# Patient Record
Sex: MALE | Race: WHITE | Employment: OTHER | URBAN - METROPOLITAN AREA
[De-identification: names, ages, dates, MRNs, and addresses within clinical notes are randomized per-mention and may not be internally consistent; named-entity substitution may affect disease eponyms.]

---

## 2020-06-08 ENCOUNTER — APPOINTMENT (OUTPATIENT)
Dept: GENERAL RADIOLOGY | Age: 25
End: 2020-06-08
Payer: COMMERCIAL

## 2020-06-08 ENCOUNTER — HOSPITAL ENCOUNTER (EMERGENCY)
Age: 25
Discharge: HOME | End: 2020-06-08
Payer: COMMERCIAL

## 2020-06-08 VITALS
HEART RATE: 79 BPM | WEIGHT: 160 LBS | DIASTOLIC BLOOD PRESSURE: 79 MMHG | RESPIRATION RATE: 16 BRPM | HEIGHT: 73 IN | OXYGEN SATURATION: 97 % | TEMPERATURE: 97.4 F | BODY MASS INDEX: 21.2 KG/M2 | SYSTOLIC BLOOD PRESSURE: 118 MMHG

## 2020-06-08 PROCEDURE — 29125 APPL SHORT ARM SPLINT STATIC: CPT

## 2020-06-08 PROCEDURE — 99213 OFFICE O/P EST LOW 20 MIN: CPT

## 2020-06-08 PROCEDURE — 73130 X-RAY EXAM OF HAND: CPT

## 2020-06-08 RX ORDER — IBUPROFEN 200 MG
800 TABLET ORAL EVERY 6 HOURS PRN
COMMUNITY

## 2020-06-08 ASSESSMENT — PAIN DESCRIPTION - PROGRESSION: CLINICAL_PROGRESSION: GRADUALLY IMPROVING

## 2020-06-08 ASSESSMENT — PAIN DESCRIPTION - ONSET: ONSET: SUDDEN

## 2020-06-08 ASSESSMENT — PAIN DESCRIPTION - DESCRIPTORS: DESCRIPTORS: THROBBING;SHOOTING

## 2020-06-08 ASSESSMENT — PAIN DESCRIPTION - FREQUENCY: FREQUENCY: INTERMITTENT

## 2020-06-08 ASSESSMENT — PAIN DESCRIPTION - LOCATION: LOCATION: HAND

## 2020-06-08 ASSESSMENT — PAIN DESCRIPTION - PAIN TYPE: TYPE: ACUTE PAIN

## 2020-06-08 ASSESSMENT — PAIN SCALES - GENERAL: PAINLEVEL_OUTOF10: 8

## 2020-06-08 ASSESSMENT — PAIN DESCRIPTION - ORIENTATION: ORIENTATION: LEFT

## 2020-06-08 NOTE — ED PROVIDER NOTES
This is a 22year old male that presents to urgent care with a complaint of left hand pain and swelling for the past 3 days. States he injured his left hand when he punched a truck window. No numbness or tingling. No other injury. Denies hand injury in the past. Is left handed. Review of Systems   Constitutional:        Pertinent positives and negatives are stated within HPI, all other systems reviewed and are negative. Physical Exam  Vitals signs and nursing note reviewed. Constitutional:       Appearance: He is well-developed. HENT:      Head: Normocephalic and atraumatic. Jaw: No trismus. Right Ear: Hearing, tympanic membrane, ear canal and external ear normal.      Left Ear: Hearing, tympanic membrane, ear canal and external ear normal.      Nose: Nose normal.      Right Sinus: No maxillary sinus tenderness or frontal sinus tenderness. Left Sinus: No maxillary sinus tenderness or frontal sinus tenderness. Mouth/Throat:      Pharynx: Uvula midline. No uvula swelling. Eyes:      General: Lids are normal.      Conjunctiva/sclera: Conjunctivae normal.      Pupils: Pupils are equal, round, and reactive to light. Neck:      Musculoskeletal: Normal range of motion and neck supple. Cardiovascular:      Rate and Rhythm: Normal rate and regular rhythm. Heart sounds: Normal heart sounds. No murmur. Pulmonary:      Effort: Pulmonary effort is normal.      Breath sounds: Normal breath sounds. Abdominal:      General: Bowel sounds are normal.      Palpations: Abdomen is soft. Abdomen is not rigid. Tenderness: There is no abdominal tenderness. There is no guarding or rebound. Musculoskeletal:      Left wrist: Normal.      Comments: Left hand pain (fourth and fifth metacarpals mostly) there is swelling to the lateral hand. No open area/redness. No cyanosis. ROM full but painful. No wrist pain. Has palpable radial pulse. Brisk cap refill.     Skin:     General: Skin

## 2020-06-09 ENCOUNTER — OFFICE VISIT (OUTPATIENT)
Dept: ORTHOPEDIC SURGERY | Age: 25
End: 2020-06-09
Payer: COMMERCIAL

## 2020-06-09 VITALS — WEIGHT: 160 LBS | HEIGHT: 73 IN | BODY MASS INDEX: 21.2 KG/M2

## 2020-06-09 PROCEDURE — 99203 OFFICE O/P NEW LOW 30 MIN: CPT | Performed by: ORTHOPAEDIC SURGERY

## 2020-06-09 PROCEDURE — 26600 TREAT METACARPAL FRACTURE: CPT | Performed by: ORTHOPAEDIC SURGERY

## 2020-06-09 PROCEDURE — G8420 CALC BMI NORM PARAMETERS: HCPCS | Performed by: ORTHOPAEDIC SURGERY

## 2020-06-09 PROCEDURE — G8427 DOCREV CUR MEDS BY ELIG CLIN: HCPCS | Performed by: ORTHOPAEDIC SURGERY

## 2020-06-09 PROCEDURE — 4004F PT TOBACCO SCREEN RCVD TLK: CPT | Performed by: ORTHOPAEDIC SURGERY

## 2020-07-22 ENCOUNTER — OFFICE VISIT (OUTPATIENT)
Dept: ORTHOPEDIC SURGERY | Age: 25
End: 2020-07-22

## 2020-07-22 VITALS — BODY MASS INDEX: 21.2 KG/M2 | HEIGHT: 73 IN | WEIGHT: 160 LBS

## 2020-07-22 PROCEDURE — 99024 POSTOP FOLLOW-UP VISIT: CPT | Performed by: NURSE PRACTITIONER

## 2020-07-22 NOTE — PROGRESS NOTES
Josue Peralta is a 26y.o. year old male who presents today for evaluation of a left hand injury which occurred on 6/8/2020. He has been in exos with nwb, overall doing well  The patient's past medical history, medications, and review of systems was reviewed. On Physical Exam, Josue Peralta is well-developed, well-nourished, and oriented to person, place and time. his gait is intact. On evaluation of his left upper extremity, there is not obvious deformity. There is no swelling and is no  ecchymosis. he is tender to palpation over the 5th base metacarpal, and otherwise nontender over the remainder of the extremity. Range of motion full over the left hand. The skin overlying the left hand is intact without evidence of lesion, laceration or abrasion. Distal pulses are 2+ and symmetric bilaterally. Sensation is grossly intact to light touch and symmetric bilaterally. Xrays:    Near healed 5th metacarpal base fracture    Impression:     Diagnosis Orders   1.  Displaced fracture of base of fifth metacarpal bone, left hand, initial encounter for closed fracture           Plan:   Dc exos  Ease into lifting pushing pulling  Fu prn  hep

## 2024-04-26 DIAGNOSIS — M21.371 RIGHT FOOT DROP: Primary | ICD-10-CM

## 2024-04-29 ENCOUNTER — PROCEDURE VISIT (OUTPATIENT)
Dept: PHYSICAL MEDICINE AND REHAB | Age: 29
End: 2024-04-29
Payer: COMMERCIAL

## 2024-04-29 VITALS — BODY MASS INDEX: 20.54 KG/M2 | HEIGHT: 73 IN | WEIGHT: 155 LBS

## 2024-04-29 DIAGNOSIS — M21.371 RIGHT FOOT DROP: Primary | ICD-10-CM

## 2024-04-29 PROCEDURE — 4004F PT TOBACCO SCREEN RCVD TLK: CPT | Performed by: PHYSICAL MEDICINE & REHABILITATION

## 2024-04-29 PROCEDURE — G8428 CUR MEDS NOT DOCUMENT: HCPCS | Performed by: PHYSICAL MEDICINE & REHABILITATION

## 2024-04-29 PROCEDURE — 99203 OFFICE O/P NEW LOW 30 MIN: CPT | Performed by: PHYSICAL MEDICINE & REHABILITATION

## 2024-04-29 PROCEDURE — 95886 MUSC TEST DONE W/N TEST COMP: CPT | Performed by: PHYSICAL MEDICINE & REHABILITATION

## 2024-04-29 PROCEDURE — G8420 CALC BMI NORM PARAMETERS: HCPCS | Performed by: PHYSICAL MEDICINE & REHABILITATION

## 2024-04-29 PROCEDURE — 95911 NRV CNDJ TEST 9-10 STUDIES: CPT | Performed by: PHYSICAL MEDICINE & REHABILITATION

## 2024-04-29 NOTE — PROGRESS NOTES
[facility]  Electrodiagnostic Laboratory  *Accredited by the [facility] with exemplary status  [address] [place] [address]  Phone: [phone]  Fax: [phone]    Referring Provider: Betito Bauer DC  Primary Care Physician: No primary care provider on file.  Patient Name: Nacho Evans  Patient YOB: 1995  Gender: male  BMI: Body mass index is 20.45 kg/m².  Height 1.854 m (6' 1\"), weight 70.3 kg (155 lb).    5/1/2024    Reason for Referral: Foot drop     Description of clinical problem:   Chief Complaint   Patient presents with    Extremity Pain     With being on the feet for prolonged periods of time will cause pain/discomfort in the entire right leg stemming from the lower back. 5+ years of symp.     Numbness     Numbness/tingling of the lateral part of the right leg from the knee down.     Extremity Weakness     Decrease strength in the leg and foot.      Sensory NCS      Nerve / Sites Rec. Site Peak Lat PP Amp Segments Distance Velocity Temp.     ms µV  cm m/s °C   R Sural - Ankle (Calf)      Calf Ankle 3.65 9.6 Calf - Ankle 14 51 31.8   R Superficial peroneal - Ankle      Lat leg Ankle NR* NR Lat leg - Ankle 10 NR 31.6   L Superficial peroneal - Ankle      Lat leg Ankle 3.02 17.7 Lat leg - Ankle 10 47 32       Motor NCS      Nerve / Sites Muscle Onset Amplitude Segments Distance Velocity Temp.     ms mV  cm m/s °C   R Peroneal - EDB      Ankle EDB 3.91 4.3 Ankle - EDB 8  31.6      Fib head EDB 11.15 4.1 Fib head - Ankle 33 46 31.6      Above Knee EDB 15.00 0.5* Above Knee - Fib head 10 26* 31.7   L Peroneal - EDB      Ankle EDB 4.27 4.8 Ankle - EDB 8  32      Fib head EDB 11.09 4.8 Fib head - Ankle 33 48 32      Above Knee EDB 13.02 4.9 Above Knee - Fib head 10 52 32   R Peroneal - Tib Ant      Fib Head Tib Ant 2.60 4.2 Fib Head - Tib Ant 8  31.8      Above Knee Tib Ant 6.09 1.2* Above Knee - Fib Head 10 29* 31.8   R Tibial - AH      Ankle AH 5.31

## 2024-04-29 NOTE — PATIENT INSTRUCTIONS
Electrodiagnotic Laboratory  Accredited by the [facility] with Exemplary status  ALEKS Perea D.O.   [facility]  [address] [place] [address]  Phone: [phone]  Fax: [phone]        Today you had an electrodiagnostic exam which included nerve conduction studies (NCS) and electromyography (EMG). This test evaluated the electrical activity of your nerves and muscles to help determine if you have a nerve or muscle disease.  This test can help determine the location and type of a nerve or muscle problem. This will help your referring doctor diagnose your condition and determine the appropriate next step in your treatment plan.     After your test:    1. There are no long lasting side effects of the test.     2. You may resume your normal activities without restrictions.     3.  Resume any medications that were stopped for the test.     4  If you have sore areas or bruising in your muscles where the needle was placed, apply a cold pack to the sore area for 15-20 minutes three to four times a day as needed for pain.  The soreness should go away in about 1-2 days.     5. Your results were provided  Briefly at the end of your test and the final detailed report will be provided to your referring physician, and/or primary care physician and any other parties you requested within 1-2 days of the examination. You may wish to contact your referring provider after a few days to determine what they would like you to do next.     6.  Please call [phone] with any questions or concerns and if you develop increased body temperature/fever, swelling, tenderness, increased pain and/or drainage from the sites where the needle was placed.     Thank you for choosing us for your health care needs.

## 2024-04-29 NOTE — PROGRESS NOTES
Electrodiagnostic Laboratory  *Accredited by the [facility] with exemplary status  [address] [place] [address]  Phone: [phone]  Fax: [phone]      Date of Examination: 05/01/24    Patient Name: Nacho Evans    An independent historian was not needed.     Nacho Evans  is a 28 y.o. year old male who was seen today regarding   Chief Complaint   Patient presents with    Extremity Pain     With being on the feet for prolonged periods of time will cause pain/discomfort in the entire right leg stemming from the lower back. 5+ years of symp.     Numbness     Numbness/tingling of the lateral part of the right leg from the knee down.     Extremity Weakness     Decrease strength in the leg and foot.    The symptoms are constant.       I have reviewed the referring provider's office note.    There is not a family history of neuromuscular conditions.     Physical Exam: General: The patient is in no apparent distress.  MSK: There is no joint effusion, deformity, instability, swelling, erythema or warmth.  AROM is full in the spine and extremities. SLR is negative Neurologic: 0/5 right great toe extension, 2/5 right ankle dorsiflexion, decreased light touch right lateral leg and dorsal foot, otherwise, no focal sensorimotor deficit.  Reflexes 2+ and symmetric. Gait is normal.    Impression:     1. Right foot drop [M21.371]        Plan:   EMG is indicated to evaluate the above diagnosis.    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  .   AFO  PT  Consider surgical consultation.   The patient was educated about the diagnosis and the prognosis.   Advised patient to follow up with referring provider.       Thank you for allowing me to participate in the care of your patient.      Sincerely,       More Robins D.O., P.T.  Board Certified Physical Medicine and Rehabilitation  Board Certified Electrodiagnostic Medicine

## 2024-05-02 DIAGNOSIS — M21.371 RIGHT FOOT DROP: ICD-10-CM

## 2024-08-01 ENCOUNTER — HOSPITAL ENCOUNTER (EMERGENCY)
Age: 29
Discharge: HOME | End: 2024-08-01
Payer: COMMERCIAL

## 2024-08-01 VITALS
HEART RATE: 98 BPM | SYSTOLIC BLOOD PRESSURE: 112 MMHG | RESPIRATION RATE: 18 BRPM | BODY MASS INDEX: 19.79 KG/M2 | OXYGEN SATURATION: 100 % | DIASTOLIC BLOOD PRESSURE: 60 MMHG | TEMPERATURE: 98 F | WEIGHT: 150 LBS

## 2024-08-01 DIAGNOSIS — L25.5 RHUS DERMATITIS: Primary | ICD-10-CM

## 2024-08-01 PROCEDURE — 99211 OFF/OP EST MAY X REQ PHY/QHP: CPT

## 2024-08-01 RX ORDER — SAL ACID/UREA/PETROLATUM,WHITE 5 %-10 %
1 OINTMENT (GRAM) TOPICAL 2 TIMES DAILY
Qty: 12 EACH | Refills: 0 | Status: SHIPPED | OUTPATIENT
Start: 2024-08-01

## 2024-08-01 ASSESSMENT — PAIN - FUNCTIONAL ASSESSMENT: PAIN_FUNCTIONAL_ASSESSMENT: 0-10

## 2024-08-01 NOTE — DISCHARGE INSTRUCTIONS
Dissolve the powdered packet of the Domeboro solution in a small dish of water, soak a washcloth or paper towel in the solution and apply directly to your rash.  Leave on for a few minutes and then remove letting the solution dry onto your skin.  This will help dry up the rash.  Do this twice daily until the rash clears.

## 2024-08-01 NOTE — ED PROVIDER NOTES
[facility]  Department of Emergency Medicine     Encounter Note  Admit Date/RoomTime: 2024  6:15 PM   Room:   NAME: Nacho Evans  : 1995  MRN: [id number]     Chief Complaint:  Rash (Poison ivy   and poison oak)    History of Present Illness       Nacho Evans is a 29 y.o. old male who presents to the urgent care with complaints of a 2-day history of a rash to his bilateral forearms.  Tried some calamine lotion on it, did not help.  States the rash is pruritic.  Believes he has poison ivy.  No fevers.  No sore throat.    ROS   Pertinent positives and negatives are stated within HPI, all other systems reviewed and are negative.    Past Medical History:  has a past medical history of Asthma, Chronic back pain, Meningitis, and Shingles.    Surgical History:  has a past surgical history that includes Tonsillectomy.    Social History:  reports that he has been smoking cigarettes. He has never used smokeless tobacco. He reports current alcohol use. He reports current drug use. Drug: Marijuana (Weed).    Family History: family history includes Depression in his mother; High Blood Pressure in his father; High Cholesterol in his father.     Allergies: Patient has no known allergies.    Physical Exam   Oxygen Saturation Interpretation: Normal.        ED Triage Vitals   BP Temp Temp src Pulse Resp SpO2 Height Weight   -- -- -- -- -- -- -- --         Constitutional:  Alert, development consistent with age.  HEENT:  NC/NT.  Airway patent.  Eyes:  PERRL, EOMI, no discharge.   Ears:  TMs without perforation, injection, or bulging.  External canals clear without exudate.  Mouth:  Mucous membranes moist without lesions, tongue and gums normal.  Throat:  Pharynx without injection, exudate, or tonsillar hypertrophy.  Airway patient.  Neck:  Supple.  No lymphadenopathy.  Respiratory:  Clear to auscultation and breath sounds equal.  CV:  Regular rate and rhythm.  GI:  Abdomen Soft,

## 2025-01-29 ENCOUNTER — HOSPITAL ENCOUNTER (EMERGENCY)
Age: 30
Discharge: HOME | End: 2025-01-29
Payer: COMMERCIAL

## 2025-01-29 VITALS
SYSTOLIC BLOOD PRESSURE: 116 MMHG | DIASTOLIC BLOOD PRESSURE: 76 MMHG | RESPIRATION RATE: 16 BRPM | WEIGHT: 145 LBS | BODY MASS INDEX: 19.13 KG/M2 | HEART RATE: 79 BPM | OXYGEN SATURATION: 100 % | TEMPERATURE: 98.6 F

## 2025-01-29 DIAGNOSIS — J01.90 ACUTE SINUSITIS, RECURRENCE NOT SPECIFIED, UNSPECIFIED LOCATION: Primary | ICD-10-CM

## 2025-01-29 LAB
INFLUENZA A BY PCR: NOT DETECTED
INFLUENZA B BY PCR: NOT DETECTED

## 2025-01-29 PROCEDURE — 99211 OFF/OP EST MAY X REQ PHY/QHP: CPT

## 2025-01-29 PROCEDURE — 87502 INFLUENZA DNA AMP PROBE: CPT

## 2025-01-29 RX ORDER — FLUTICASONE PROPIONATE 50 MCG
SPRAY, SUSPENSION (ML) NASAL
Qty: 16 G | Refills: 0 | Status: SHIPPED | OUTPATIENT
Start: 2025-01-29

## 2025-01-29 RX ORDER — AMOXICILLIN 875 MG/1
875 TABLET, COATED ORAL 2 TIMES DAILY
Qty: 14 TABLET | Refills: 0 | Status: SHIPPED | OUTPATIENT
Start: 2025-01-29 | End: 2025-02-05

## 2025-01-29 ASSESSMENT — PAIN - FUNCTIONAL ASSESSMENT: PAIN_FUNCTIONAL_ASSESSMENT: NONE - DENIES PAIN

## 2025-01-29 NOTE — DISCHARGE INSTRUCTIONS
Dayquil  Tylenol 650 to 1000 mg every 8 hours as needed for pain/fever.  Ibuprofen 600 mg every 8 hours as needed for inflammation/pain/fever. Take with food and water.

## 2025-01-29 NOTE — ED PROVIDER NOTES
[facility] URGENT CARE  EMERGENCY DEPARTMENT ENCOUNTER        NAME: Nacho Evans  :  1995  MRN:  [id number]  Date of evaluation: 2025  Provider: Kevin Davidson PA-C  PCP: No primary care provider on file.  Note Started : 11:51 AM EST 25    Chief Complaint: Nasal Congestion (Head cold, sore throat, stuffy/runny nose, started Thursday night)      This is a 29-year-old male that presents to urgent care complaining of sinus and URI symptoms for the past 6 days.  There is been a cough as well.  Patient denies any abdominal pain nausea vomiting diarrhea urinary symptoms.  On first contact patient he appears to be in no acute distress.      Review of Systems  Pertinent positives and negatives are stated within HPI, all other systems reviewed and are negative.     Allergies: Patient has no known allergies.     --------------------------------------------- PAST HISTORY ---------------------------------------------  Past Medical History:  has a past medical history of Asthma, Chronic back pain, Meningitis, and Shingles.    Past Surgical History:  has a past surgical history that includes Tonsillectomy.    Social History:  reports that he has quit smoking. His smoking use included cigarettes. He has never used smokeless tobacco. He reports current alcohol use. He reports current drug use. Drug: Marijuana (Weed).    Family History: family history includes Depression in his mother; High Blood Pressure in his father; High Cholesterol in his father.     The patient’s home medications have been reviewed.    The nursing notes within the ED encounter have been reviewed.     ------------------------------------------------SCREENINGS----------------------------------------------                        CIWA Assessment  BP: 116/76  Pulse: 79           ---------------------------------------------PHYSICAL EXAM --------------------------------------------    Vitals:    25 1151   BP: 116/76   Pulse:

## 2025-03-07 ENCOUNTER — HOSPITAL ENCOUNTER (EMERGENCY)
Age: 30
Discharge: HOME | End: 2025-03-08
Payer: COMMERCIAL

## 2025-03-07 DIAGNOSIS — R19.7 NAUSEA VOMITING AND DIARRHEA: Primary | ICD-10-CM

## 2025-03-07 DIAGNOSIS — R10.84 GENERALIZED ABDOMINAL PAIN: ICD-10-CM

## 2025-03-07 DIAGNOSIS — R11.2 NAUSEA VOMITING AND DIARRHEA: Primary | ICD-10-CM

## 2025-03-07 PROCEDURE — 83690 ASSAY OF LIPASE: CPT

## 2025-03-07 PROCEDURE — 99284 EMERGENCY DEPT VISIT MOD MDM: CPT

## 2025-03-07 PROCEDURE — 6360000002 HC RX W HCPCS

## 2025-03-07 PROCEDURE — 2580000003 HC RX 258

## 2025-03-07 PROCEDURE — 83605 ASSAY OF LACTIC ACID: CPT

## 2025-03-07 PROCEDURE — 85025 COMPLETE CBC W/AUTO DIFF WBC: CPT

## 2025-03-07 PROCEDURE — 80053 COMPREHEN METABOLIC PANEL: CPT

## 2025-03-07 PROCEDURE — 96375 TX/PRO/DX INJ NEW DRUG ADDON: CPT

## 2025-03-07 PROCEDURE — 81001 URINALYSIS AUTO W/SCOPE: CPT

## 2025-03-07 PROCEDURE — 96374 THER/PROPH/DIAG INJ IV PUSH: CPT

## 2025-03-07 RX ORDER — 0.9 % SODIUM CHLORIDE 0.9 %
2000 INTRAVENOUS SOLUTION INTRAVENOUS ONCE
Status: COMPLETED | OUTPATIENT
Start: 2025-03-07 | End: 2025-03-08

## 2025-03-07 RX ORDER — ONDANSETRON 2 MG/ML
4 INJECTION INTRAMUSCULAR; INTRAVENOUS ONCE
Status: COMPLETED | OUTPATIENT
Start: 2025-03-07 | End: 2025-03-07

## 2025-03-07 RX ORDER — KETOROLAC TROMETHAMINE 30 MG/ML
30 INJECTION, SOLUTION INTRAMUSCULAR; INTRAVENOUS ONCE
Status: COMPLETED | OUTPATIENT
Start: 2025-03-07 | End: 2025-03-07

## 2025-03-07 RX ADMIN — ONDANSETRON 4 MG: 2 INJECTION INTRAMUSCULAR; INTRAVENOUS at 23:27

## 2025-03-07 RX ADMIN — KETOROLAC TROMETHAMINE 30 MG: 30 INJECTION INTRAMUSCULAR; INTRAVENOUS at 23:27

## 2025-03-07 RX ADMIN — SODIUM CHLORIDE 2000 ML: 0.9 INJECTION, SOLUTION INTRAVENOUS at 23:28

## 2025-03-07 ASSESSMENT — PAIN DESCRIPTION - LOCATION: LOCATION: BACK

## 2025-03-07 ASSESSMENT — PAIN DESCRIPTION - DESCRIPTORS: DESCRIPTORS: OTHER (COMMENT)

## 2025-03-07 ASSESSMENT — LIFESTYLE VARIABLES
HOW MANY STANDARD DRINKS CONTAINING ALCOHOL DO YOU HAVE ON A TYPICAL DAY: PATIENT DOES NOT DRINK
HOW OFTEN DO YOU HAVE A DRINK CONTAINING ALCOHOL: MONTHLY OR LESS

## 2025-03-07 ASSESSMENT — PAIN DESCRIPTION - ORIENTATION: ORIENTATION: MID

## 2025-03-07 ASSESSMENT — PAIN SCALES - GENERAL: PAINLEVEL_OUTOF10: 7

## 2025-03-08 VITALS
BODY MASS INDEX: 19.79 KG/M2 | WEIGHT: 150 LBS | DIASTOLIC BLOOD PRESSURE: 67 MMHG | RESPIRATION RATE: 18 BRPM | OXYGEN SATURATION: 98 % | SYSTOLIC BLOOD PRESSURE: 112 MMHG | TEMPERATURE: 99.1 F | HEART RATE: 64 BPM

## 2025-03-08 LAB
ALBUMIN SERPL-MCNC: 4.8 G/DL (ref 3.5–5.2)
ALP SERPL-CCNC: 82 U/L (ref 40–129)
ALT SERPL-CCNC: 13 U/L (ref 0–40)
ANION GAP SERPL CALCULATED.3IONS-SCNC: 16 MMOL/L (ref 7–16)
AST SERPL-CCNC: 16 U/L (ref 0–39)
ATYPICAL LYMPHOCYTE ABSOLUTE COUNT: 0.17 K/UL (ref 0–0.46)
ATYPICAL LYMPHOCYTES: 2 % (ref 0–4)
BASOPHILS # BLD: 0 K/UL (ref 0–0.2)
BASOPHILS NFR BLD: 0 % (ref 0–2)
BILIRUB SERPL-MCNC: 0.7 MG/DL (ref 0–1.2)
BILIRUB UR QL STRIP: ABNORMAL
BUN SERPL-MCNC: 15 MG/DL (ref 6–20)
CALCIUM SERPL-MCNC: 9.5 MG/DL (ref 8.6–10.2)
CHLORIDE SERPL-SCNC: 96 MMOL/L (ref 98–107)
CLARITY UR: CLEAR
CO2 SERPL-SCNC: 22 MMOL/L (ref 22–29)
COLOR UR: YELLOW
CREAT SERPL-MCNC: 0.9 MG/DL (ref 0.7–1.2)
CRYSTALS URNS MICRO: ABNORMAL /HPF
EOSINOPHIL # BLD: 0 K/UL (ref 0.05–0.5)
EOSINOPHILS RELATIVE PERCENT: 0 % (ref 0–6)
ERYTHROCYTE [DISTWIDTH] IN BLOOD BY AUTOMATED COUNT: 12.7 % (ref 11.5–15)
GFR, ESTIMATED: >90 ML/MIN/1.73M2
GLUCOSE SERPL-MCNC: 113 MG/DL (ref 74–99)
GLUCOSE UR STRIP-MCNC: NEGATIVE MG/DL
HCT VFR BLD AUTO: 45.4 % (ref 37–54)
HGB BLD-MCNC: 15.8 G/DL (ref 12.5–16.5)
HGB UR QL STRIP.AUTO: NEGATIVE
KETONES UR STRIP-MCNC: >80 MG/DL
LACTATE BLDV-SCNC: 1 MMOL/L (ref 0.5–2.2)
LEUKOCYTE ESTERASE UR QL STRIP: NEGATIVE
LIPASE SERPL-CCNC: 16 U/L (ref 13–60)
LYMPHOCYTES NFR BLD: 0.08 K/UL (ref 1.5–4)
LYMPHOCYTES RELATIVE PERCENT: 1 % (ref 20–42)
MCH RBC QN AUTO: 29.5 PG (ref 26–35)
MCHC RBC AUTO-ENTMCNC: 34.8 G/DL (ref 32–34.5)
MCV RBC AUTO: 84.7 FL (ref 80–99.9)
MONOCYTES NFR BLD: 0.33 K/UL (ref 0.1–0.95)
MONOCYTES NFR BLD: 4 % (ref 2–12)
NEUTROPHILS NFR BLD: 94 % (ref 43–80)
NEUTS SEG NFR BLD: 9.02 K/UL (ref 1.8–7.3)
NITRITE UR QL STRIP: NEGATIVE
PH UR STRIP: 6 [PH] (ref 5–8)
PLATELET # BLD AUTO: 202 K/UL (ref 130–450)
PMV BLD AUTO: 9.9 FL (ref 7–12)
POTASSIUM SERPL-SCNC: 3.9 MMOL/L (ref 3.5–5)
PROT SERPL-MCNC: 7.4 G/DL (ref 6.4–8.3)
PROT UR STRIP-MCNC: ABNORMAL MG/DL
RBC # BLD AUTO: 5.36 M/UL (ref 3.8–5.8)
RBC # BLD: ABNORMAL 10*6/UL
RBC #/AREA URNS HPF: ABNORMAL /HPF
SODIUM SERPL-SCNC: 134 MMOL/L (ref 132–146)
SP GR UR STRIP: >1.03 (ref 1–1.03)
UROBILINOGEN UR STRIP-ACNC: 0.2 EU/DL (ref 0–1)
WBC #/AREA URNS HPF: ABNORMAL /HPF
WBC OTHER # BLD: 9.6 K/UL (ref 4.5–11.5)

## 2025-03-08 RX ORDER — ONDANSETRON 4 MG/1
4 TABLET, ORALLY DISINTEGRATING ORAL 3 TIMES DAILY PRN
Qty: 21 TABLET | Refills: 0 | Status: SHIPPED | OUTPATIENT
Start: 2025-03-08

## 2025-03-08 RX ORDER — DICYCLOMINE HCL 20 MG
20 TABLET ORAL 4 TIMES DAILY
Qty: 28 TABLET | Refills: 0 | Status: SHIPPED | OUTPATIENT
Start: 2025-03-08 | End: 2025-03-15

## 2025-03-08 ASSESSMENT — PAIN SCALES - GENERAL: PAINLEVEL_OUTOF10: 0

## 2025-03-08 ASSESSMENT — PAIN - FUNCTIONAL ASSESSMENT: PAIN_FUNCTIONAL_ASSESSMENT: NONE - DENIES PAIN

## 2025-03-08 NOTE — ED PROVIDER NOTES
Independent EVELIN Visit.          [facility] EMERGENCY DEPARTMENT  EMERGENCY DEPARTMENT ENCOUNTER        Pt Name: Nacho Evans  MRN: [id number]  Birthdate 1995  Date of evaluation: 3/7/2025  Provider: BECK Paredes CNP  PCP: No primary care provider on file.  Note Started: 8:13 PM EST 3/7/25    CHIEF COMPLAINT       Chief Complaint   Patient presents with    Vomiting     Vomiting and diarrhea today, abd pain, chills, bodyaches, headache,        HISTORY OF PRESENT ILLNESS: 1 or more Elements   History from : Patient and chart review  Limitations to history : None    Nacho Evans is a 29 y.o. male with a history of asthma, chronic back pain, meningitis, and shingle who presents to the emergency department by private vehicle, for evaluation due to nausea, vomiting, diarrhea, and abdominal cramping that started 3 days ago.  Emesis is nonbilious and nonbloody.  There has been no rectal bleeding or melena.  He estimates that he has had 20 episodes of vomiting and 3 episodes of diarrhea. The pain does not radiate. Since onset the symptoms have been stable.  The pain is associated with chills, body aches, and a mild headache.  The pain is aggravated by eating and relieved by nothing. There has been NO back pain, fever, constipation, urinary frequency, dysuria, hematuria, urinary urgency, or scrotal pain. No known sick contacts. He did drink a few (2-3) alcoholic beverages last night. He is concerned he may have eaten some bad food. His family prepared dinner but he was the only one that ate.     Nursing Notes were all reviewed and agreed with or any disagreements were addressed in the HPI.    REVIEW OF SYSTEMS :      Review of Systems    Positives and Pertinent negatives as per HPI.     PAST MEDICAL HISTORY    has a past medical history of Asthma, Chronic back pain, Meningitis, and Shingles.     SURGICAL HISTORY     Past Surgical History:   Procedure Laterality Date

## 2025-03-08 NOTE — DISCHARGE INSTR - COC
/ NO:}  Bladder: {YES / NO:}  Urinary Catheter: {Urinary Catheter:178473518}   Colostomy/Ileostomy/Ileal Conduit: {YES / NO:}       Date of Last BM: ***  No intake or output data in the 24 hours ending 25 0246  No intake/output data recorded.    Safety Concerns:     { TOR Safety Concerns:535912515}    Impairments/Disabilities:      { TOR Impairments/Disabilities:514382652}    Nutrition Therapy:  Current Nutrition Therapy:   { TOR Diet List:994589501}    Routes of Feeding: {[facility] DME Other Feedings:604201006}  Liquids: {Slp liquid thickness:37375}  Daily Fluid Restriction: {[facility] DME Yes amt example:519781459}  Last Modified Barium Swallow with Video (Video Swallowing Test): {Done Not Done Date:119990331}    Treatments at the Time of Hospital Discharge:   Respiratory Treatments: ***  Oxygen Therapy:  {Therapy; copd oxygen:16131}  Ventilator:    {[facility] Vent List:267127788}    Rehab Therapies: {THERAPEUTIC INTERVENTION:2650204026}  Weight Bearing Status/Restrictions: {[facility] Weight Bearin}  Other Medical Equipment (for information only, NOT a DME order):  {EQUIPMENT:345906887}  Other Treatments: ***    Patient's personal belongings (please select all that are sent with patient):  {[facility] DME Belongings:121560805}    RN SIGNATURE:  {Esignature:021806969}    CASE MANAGEMENT/SOCIAL WORK SECTION    Inpatient Status Date: ***    Readmission Risk Assessment Score:  [facility] RISK OF UNPLANNED READMISSION 2.0             0 Total Score        Discharging to Facility/ Agency   Name:   Address:  Phone:  Fax:    Dialysis Facility (if applicable)   Name:  Address:  Dialysis Schedule:  Phone:  Fax:    / signature: {Esignature:554071879}    PHYSICIAN SECTION    Prognosis: {Prognosis:4835944114}    Condition at Discharge: { Patient Condition:041090775}    Rehab Potential (if transferring to Rehab): {Prognosis:5878292566}    Recommended Labs or Other Treatments After Discharge:  ***    Physician Certification: I certify the above information and transfer of Nacho Evans  is necessary for the continuing treatment of the diagnosis listed and that he requires {Admit to Appropriate Level of Care:32168} for {GREATER/LESS:475874192} 30 days.     Update Admission H&P: {[facility] DME Changes in HandP:234353757}    PHYSICIAN SIGNATURE:  {Esignature:207099597}

## 2025-03-08 NOTE — DISCHARGE INSTRUCTIONS
03/07/25 23:16   RBC Morphology 1+ GABRIELA CELLS  1+ OVALOCYTES  1+ POIKILOCYTOSIS  1+ POLYCHROMASIA  1+ STOMATOCYTES   Follow up with your PMD for repeat CBC in 1 -2 weeks.

## 2025-03-21 ENCOUNTER — TELEPHONE (OUTPATIENT)
Dept: CARDIOLOGY CLINIC | Age: 30
End: 2025-03-21

## 2025-03-31 ENCOUNTER — HOSPITAL ENCOUNTER (EMERGENCY)
Age: 30
Discharge: HOME | End: 2025-03-31
Payer: COMMERCIAL

## 2025-03-31 VITALS
SYSTOLIC BLOOD PRESSURE: 104 MMHG | WEIGHT: 150 LBS | OXYGEN SATURATION: 100 % | BODY MASS INDEX: 19.79 KG/M2 | RESPIRATION RATE: 20 BRPM | TEMPERATURE: 97.7 F | DIASTOLIC BLOOD PRESSURE: 75 MMHG | HEART RATE: 88 BPM

## 2025-03-31 DIAGNOSIS — J20.9 ACUTE BRONCHITIS, UNSPECIFIED ORGANISM: Primary | ICD-10-CM

## 2025-03-31 PROCEDURE — 99211 OFF/OP EST MAY X REQ PHY/QHP: CPT

## 2025-03-31 RX ORDER — ALBUTEROL SULFATE 90 UG/1
2 INHALANT RESPIRATORY (INHALATION) 4 TIMES DAILY PRN
Qty: 1 EACH | Refills: 0 | Status: SHIPPED | OUTPATIENT
Start: 2025-03-31

## 2025-03-31 RX ORDER — AZITHROMYCIN 250 MG/1
TABLET, FILM COATED ORAL
Qty: 1 PACKET | Refills: 0 | Status: SHIPPED | OUTPATIENT
Start: 2025-03-31 | End: 2025-04-10

## 2025-03-31 RX ORDER — PREDNISONE 20 MG/1
20 TABLET ORAL DAILY
Qty: 5 TABLET | Refills: 0 | Status: SHIPPED | OUTPATIENT
Start: 2025-03-31 | End: 2025-04-05

## 2025-03-31 ASSESSMENT — PAIN SCALES - GENERAL: PAINLEVEL_OUTOF10: 0

## 2025-03-31 ASSESSMENT — PAIN - FUNCTIONAL ASSESSMENT: PAIN_FUNCTIONAL_ASSESSMENT: 0-10

## 2025-03-31 NOTE — ED PROVIDER NOTES
[facility] URGENT CARE  EMERGENCY DEPARTMENT ENCOUNTER        NAME: Nacho Evans  :  1995  MRN:  [id number]  Date of evaluation: 3/31/2025  Provider: Kevin Davidson PA-C  PCP: No primary care provider on file.  Note Started : 11:37 AM EDT 3/31/25    Chief Complaint: Generalized Body Aches (Body aches, lethargic, sore throat, coughing, and congestion for 2 days. )      This is a 29-year-old male who presents to urgent care complaining of cough and cold symptoms body aches and there has been some wheezing.  He denies any shortness of breath at this time.  He does vape.  No abdominal pain nausea vomiting diarrhea or urinary symptoms.  On first contact patient he appears to be in no acute distress.        Review of Systems  Pertinent positives and negatives are stated within HPI, all other systems reviewed and are negative.     Allergies: Patient has no known allergies.     --------------------------------------------- PAST HISTORY ---------------------------------------------  Past Medical History:  has a past medical history of Asthma, Chronic back pain, Meningitis, and Shingles.    Past Surgical History:  has a past surgical history that includes Tonsillectomy.    Social History:  reports that he has quit smoking. His smoking use included cigarettes. He has never used smokeless tobacco. He reports current alcohol use. He reports current drug use. Drug: Marijuana (Weed).    Family History: family history includes Depression in his mother; High Blood Pressure in his father; High Cholesterol in his father.     The patient’s home medications have been reviewed.    The nursing notes within the ED encounter have been reviewed.     ------------------------------------------------SCREENINGS----------------------------------------------                        CIWA Assessment  BP: 104/75  Pulse: 88           ---------------------------------------------PHYSICAL EXAM

## 2025-04-14 ENCOUNTER — HOSPITAL ENCOUNTER (EMERGENCY)
Age: 30
Discharge: HOME | End: 2025-04-14
Payer: COMMERCIAL

## 2025-04-14 VITALS
OXYGEN SATURATION: 99 % | TEMPERATURE: 97.9 F | SYSTOLIC BLOOD PRESSURE: 107 MMHG | HEART RATE: 77 BPM | RESPIRATION RATE: 18 BRPM | DIASTOLIC BLOOD PRESSURE: 74 MMHG

## 2025-04-14 DIAGNOSIS — J06.9 UPPER RESPIRATORY TRACT INFECTION, UNSPECIFIED TYPE: Primary | ICD-10-CM

## 2025-04-14 PROCEDURE — 99211 OFF/OP EST MAY X REQ PHY/QHP: CPT

## 2025-04-14 RX ORDER — AZITHROMYCIN 250 MG/1
TABLET, FILM COATED ORAL
Qty: 6 TABLET | Refills: 0 | Status: SHIPPED | OUTPATIENT
Start: 2025-04-14 | End: 2025-04-24

## 2025-04-14 RX ORDER — PREDNISONE 50 MG/1
50 TABLET ORAL DAILY
Qty: 5 TABLET | Refills: 0 | Status: SHIPPED | OUTPATIENT
Start: 2025-04-14 | End: 2025-04-19

## 2025-04-14 ASSESSMENT — PAIN - FUNCTIONAL ASSESSMENT: PAIN_FUNCTIONAL_ASSESSMENT: 0-10

## 2025-04-14 ASSESSMENT — PAIN SCALES - GENERAL: PAINLEVEL_OUTOF10: 5

## 2025-04-14 NOTE — ED PROVIDER NOTES
discussed my risk assessment with the patient and the patient consents to the risk of discharge as well as the risk of uncertainty in estimating outcomes. At this time, the risk of acute decompensation with death before the patient can return for re-evaluation is most likely lower than the risk of death attributable to being in the hospital.     Plan of Care/Counseling:  Christy Arriola NP reviewed today's visit with the patient in addition to providing specific details for the plan of care and counseling regarding the diagnosis and prognosis.  Questions are answered at this time and are agreeable with the plan.    ASSESSMENT     1. Upper respiratory tract infection, unspecified type        DISPOSITION   Discharged home.  Patient condition is stable.    Discharge Instructions:   Patient referred to  [facility]  [phone]  Call in 2 days      [facility]  [address] [place] [phone]        NEW MEDICATIONS     Discharge Medication List as of 4/14/2025  6:53 PM        START taking these medications    Details   predniSONE (DELTASONE) 50 MG tablet Take 1 tablet by mouth daily for 5 days, Disp-5 tablet, R-0Normal      azithromycin (ZITHROMAX) 250 MG tablet 500mg on day 1 followed by 250mg on days 2 - 5, Disp-6 tablet, R-0Normal           Electronically signed by BECK Mancilla CNP   DD: 4/14/25  **This report was transcribed using voice recognition software. Every effort was made to ensure accuracy; however, inadvertent computerized transcription errors may be present.  END OF ED PROVIDER NOTE      Christy Little APRN - CNP  04/16/25 1023

## 2025-05-09 ENCOUNTER — TELEPHONE (OUTPATIENT)
Dept: CARDIOLOGY CLINIC | Age: 30
End: 2025-05-09

## 2025-05-12 ENCOUNTER — OFFICE VISIT (OUTPATIENT)
Dept: CARDIOLOGY CLINIC | Age: 30
End: 2025-05-12
Payer: COMMERCIAL

## 2025-05-12 VITALS
BODY MASS INDEX: 21.18 KG/M2 | DIASTOLIC BLOOD PRESSURE: 60 MMHG | RESPIRATION RATE: 18 BRPM | HEIGHT: 73 IN | SYSTOLIC BLOOD PRESSURE: 116 MMHG | HEART RATE: 58 BPM | WEIGHT: 159.8 LBS

## 2025-05-12 DIAGNOSIS — R94.31 ABNORMAL ELECTROCARDIOGRAM (ECG) (EKG): Primary | ICD-10-CM

## 2025-05-12 PROCEDURE — 93000 ELECTROCARDIOGRAM COMPLETE: CPT | Performed by: INTERNAL MEDICINE

## 2025-05-12 PROCEDURE — 1036F TOBACCO NON-USER: CPT | Performed by: INTERNAL MEDICINE

## 2025-05-12 PROCEDURE — G8427 DOCREV CUR MEDS BY ELIG CLIN: HCPCS | Performed by: INTERNAL MEDICINE

## 2025-05-12 PROCEDURE — G8420 CALC BMI NORM PARAMETERS: HCPCS | Performed by: INTERNAL MEDICINE

## 2025-05-12 PROCEDURE — 99202 OFFICE O/P NEW SF 15 MIN: CPT | Performed by: INTERNAL MEDICINE

## 2025-05-12 RX ORDER — M-VIT,TX,IRON,MINS/CALC/FOLIC 27MG-0.4MG
1 TABLET ORAL DAILY
COMMUNITY

## 2025-05-12 ASSESSMENT — ENCOUNTER SYMPTOMS
BLOOD IN STOOL: 0
COUGH: 0
ABDOMINAL PAIN: 0
DIARRHEA: 0
CONSTIPATION: 0
BACK PAIN: 0
NAUSEA: 0
WHEEZING: 0
SHORTNESS OF BREATH: 0
VOMITING: 0

## 2025-05-12 NOTE — PROGRESS NOTES
OUTPATIENT CARDIOLOGY CONSULT    Name: Nacho Evans    Age: 29 y.o.    Date of Service: 5/12/2025    Reason for Consultation:   Chief Complaint   Patient presents with    New Patient        Referring Physician: No primary care provider on file.    History of Present Illness:  29-year-old ex-smoker male was referred for cardiac evaluation due to abnormal EKG  He has history of asthma, meningitis, shingles and chronic back pain.    Patient used to smoke 1 pack a day for 10 years and he quit 2 years ago.  He has been vaping.  He drinks few beers every few days after coming back from work.  He was drinking 2-3 bottles of Pepsi a day and he cut down.  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  He feels occasional lightheadedness but denies syncope.  He denies palpitations or lower extremity edema.    EKG done today revealed sinus bradycardia at 58 bpm with right bundle branch block.    Review of Systems:  Review of Systems   Constitutional:  Negative for chills, fatigue and fever.   HENT:  Negative for nosebleeds.    Respiratory:  Negative for cough, shortness of breath and wheezing.    Gastrointestinal:  Negative for abdominal pain, blood in stool, constipation, diarrhea, nausea and vomiting.   Genitourinary:  Negative for dysuria and hematuria.   Musculoskeletal:  Negative for back pain, joint swelling and myalgias.   Neurological:  Negative for syncope and light-headedness.   Psychiatric/Behavioral:  The patient is not nervous/anxious.           Past Medical History:  Past Medical History:   Diagnosis Date    Asthma     Chronic back pain     Meningitis     Shingles        Past Surgical History:  Past Surgical History:   Procedure Laterality Date    TONSILLECTOMY         Family History:  Family History   Problem Relation Age of Onset    Depression Mother     High Cholesterol Father     High Blood Pressure Father        Social